# Patient Record
Sex: FEMALE | Race: WHITE | HISPANIC OR LATINO
[De-identification: names, ages, dates, MRNs, and addresses within clinical notes are randomized per-mention and may not be internally consistent; named-entity substitution may affect disease eponyms.]

---

## 2022-05-12 PROBLEM — Z00.00 ENCOUNTER FOR PREVENTIVE HEALTH EXAMINATION: Status: ACTIVE | Noted: 2022-05-12

## 2022-05-17 ENCOUNTER — APPOINTMENT (OUTPATIENT)
Dept: PAIN MANAGEMENT | Facility: CLINIC | Age: 62
End: 2022-05-17
Payer: MEDICAID

## 2022-05-17 ENCOUNTER — NON-APPOINTMENT (OUTPATIENT)
Age: 62
End: 2022-05-17

## 2022-05-17 VITALS
DIASTOLIC BLOOD PRESSURE: 68 MMHG | TEMPERATURE: 98 F | WEIGHT: 116 LBS | SYSTOLIC BLOOD PRESSURE: 132 MMHG | HEIGHT: 64 IN | BODY MASS INDEX: 19.81 KG/M2

## 2022-05-17 DIAGNOSIS — Z86.79 PERSONAL HISTORY OF OTHER DISEASES OF THE CIRCULATORY SYSTEM: ICD-10-CM

## 2022-05-17 DIAGNOSIS — Z78.9 OTHER SPECIFIED HEALTH STATUS: ICD-10-CM

## 2022-05-17 PROCEDURE — 99204 OFFICE O/P NEW MOD 45 MIN: CPT

## 2022-05-17 RX ORDER — NEBIVOLOL HYDROCHLORIDE 2.5 MG/1
2.5 TABLET ORAL
Refills: 0 | Status: ACTIVE | COMMUNITY

## 2022-05-17 NOTE — ASSESSMENT
[FreeTextEntry1] : 62 yof w/ severe low back and bilateral leg pain associated with numbness and weakness.\par \par I have personally reviewed the patient's xray in detail and discussed it with them which is significant for spondylosis at L5-S1.\par \par The patient has failed to have relief with over six weeks of physical therapy within the last three months and all medications. GIven their failure to improve with all other conservative measures recommend MRI lumbar spine. Patient will return to review imaging and plan for potential intervention.\par \par Physical therapy prescribed - goal will be to increase ROM, strengthening, postural training, other modalities ad nikole which may include massage and stim. Goals of therapy discussed with the patient in detail and will be discussed with physical therapist. Patient will follow-up following course of physical therapy to monitor progress and adjust therapy as needed.\par \par Acetaminophen 1,000 mg q8h prn for moderate pain. Risks, benefits, and alternatives of acetaminophen discussed with patient.\par \par \par Diet and nutritional strategies discussed which may improve patients pain and will improve overall health.\par \par

## 2022-05-17 NOTE — PHYSICAL EXAM

## 2022-05-17 NOTE — HISTORY OF PRESENT ILLNESS
[8] : 3. What number best describes how, during the past week, pain has interfered with your general activity? 8/10 pain [___ yrs] : [unfilled] year(s) ago [Paroxysmal] : paroxysmal [7] : a minimum pain level of 7/10 [9] : a maximum pain level of 9/10 [Dull] : dull [Aching] : aching [Throbbing] : throbbing [Shooting] : shooting [Bending] : bending [Walking] : walking [FreeTextEntry1] : 62 yof presents w/ severe low back pain. The pain radiates down her lower extremities bilaterally. Her legs feel heavy. Her legs are numb. Quality of life is impaired. There has been a severe exacerbation of the patient's chronic pain. [FreeTextEntry2] : 24 [FreeTextEntry7] : Lower back pain

## 2022-06-09 ENCOUNTER — APPOINTMENT (OUTPATIENT)
Dept: PAIN MANAGEMENT | Facility: CLINIC | Age: 62
End: 2022-06-09
Payer: MEDICAID

## 2022-06-09 PROCEDURE — 99213 OFFICE O/P EST LOW 20 MIN: CPT | Mod: 95

## 2022-06-09 NOTE — HISTORY OF PRESENT ILLNESS
[___ yrs] : [unfilled] year(s) ago [Paroxysmal] : paroxysmal [7] : a minimum pain level of 7/10 [9] : a maximum pain level of 9/10 [Dull] : dull [Aching] : aching [Throbbing] : throbbing [Shooting] : shooting [Bending] : bending [Walking] : walking [8] : 3. What number best describes how, during the past week, pain has interfered with your general activity? 8/10 pain [FreeTextEntry1] : Verbal consent was given by/on: Edwin Wilcox (06/09/22):\par \par Patient location: Home\par \par Physician location: Office\par \par Reason for Telehealth visit: Back pain\par \par She wishes to review recent MRI. She still has pain down both of her legs. Quality of life is impaired. There has been a severe exacerbation of the patient's chronic pain. \par \par This service took place using a two way audio and visual platform. The patient and Dr. Basilio were both able to see each other and communicate through video. There were no barriers to communication. Greater then 50% of the time spent in the encounter involved counseling and coordination of care. \par \par Time spent on visit: 30 minutes\par \par \par HPI: 62 yof presents w/ severe low back pain. The pain radiates down her lower extremities bilaterally. Her legs feel heavy. Her legs are numb. Quality of life is impaired. There has been a severe exacerbation of the patient's chronic pain. [FreeTextEntry7] : Lower back pain  [FreeTextEntry2] : 24

## 2022-06-09 NOTE — ASSESSMENT
[FreeTextEntry1] : 62 yof w/ severe low back and bilateral leg pain associated with numbness and weakness.\par \par I have personally reviewed the patient's xray in detail and discussed it with them which is significant for spondylosis at L5-S1.\par \par I have personally reviewed the patient's MRI in detail and discussed it with them which is significant for moderate bilateral foraminal stenosis at L5-S1.\par \par Physical therapy prescribed - goal will be to increase ROM, strengthening, postural training, other modalities ad nikole which may include massage and stim. Goals of therapy discussed with the patient in detail and will be discussed with physical therapist. Patient will follow-up following course of physical therapy to monitor progress and adjust therapy as needed.\par \par Acetaminophen 1,000 mg q8h prn for moderate pain. Risks, benefits, and alternatives of acetaminophen discussed with patient.\par \par If no relief plan for L5-S1 interlaminar CHETAN.\par \par Diet and nutritional strategies discussed which may improve patients pain and will improve overall health.\par \par I explained to patient benefits and limitation of TeleMedicine visits. Patient understands that limitations include inability to perform comprehensive physical exam, which may lead to potential diagnostic inconsistencies.  Patient understands that diagnosis and treatment may be limited by these inconsistencies and patient agrees to proceed with care plan\par \par \par \par \par

## 2022-06-09 NOTE — PHYSICAL EXAM
[de-identified] : Constitutional: Well-developed, in no acute distress\par \par Psychiatric: Appropriate mood and affect, oriented to time, place, person, and situation\par \par \par

## 2022-08-30 ENCOUNTER — APPOINTMENT (OUTPATIENT)
Dept: PAIN MANAGEMENT | Facility: CLINIC | Age: 62
End: 2022-08-30

## 2022-08-30 VITALS
WEIGHT: 120 LBS | HEART RATE: 67 BPM | DIASTOLIC BLOOD PRESSURE: 70 MMHG | HEIGHT: 64 IN | BODY MASS INDEX: 20.49 KG/M2 | SYSTOLIC BLOOD PRESSURE: 117 MMHG

## 2022-08-30 PROCEDURE — 99214 OFFICE O/P EST MOD 30 MIN: CPT

## 2022-08-30 NOTE — HISTORY OF PRESENT ILLNESS
[___ yrs] : [unfilled] year(s) ago [Paroxysmal] : paroxysmal [7] : a minimum pain level of 7/10 [9] : a maximum pain level of 9/10 [Dull] : dull [Aching] : aching [Throbbing] : throbbing [Shooting] : shooting [Bending] : bending [Walking] : walking [8] : 3. What number best describes how, during the past week, pain has interfered with your general activity? 8/10 pain [FreeTextEntry1] : Interval History:\par Patient returns for follow-up today. Her legs remain numb. She continues to have pain. She is doing physical therapy and stretching. Quality of life is impaired. There has been a severe exacerbation of the patient's chronic pain. She does have new pain in her upper back. She is doing yoga.\par \par \par HPI: 62 yof presents w/ severe low back pain. The pain radiates down her lower extremities bilaterally. Her legs feel heavy. Her legs are numb. Quality of life is impaired. There has been a severe exacerbation of the patient's chronic pain. [FreeTextEntry7] : Lower back pain  [FreeTextEntry2] : 24

## 2022-08-30 NOTE — DATA REVIEWED
[FreeTextEntry1] : MRI Lumbar Spine (05/27/22):\par \par L4-L5: posterior disc bulge, central and neural foramina are patent\par L5-S1: 2 mm retrolisthesis, moderate bilateral foraminal stenosis, subligamentous disc herniation. No spinal stenosis.\par \par Xray Lumbar Spine:\par Multiple levels of facet arthropathy worst at L5-S1.

## 2022-08-30 NOTE — ASSESSMENT
[FreeTextEntry1] : 62 yof w/ severe low back and bilateral leg pain associated with numbness and weakness. New upper back pain is likely related to muscular spasm, cannot rule out disc related issue. \par \par I have personally reviewed the patient's xray in detail and discussed it with them which is significant for spondylosis at L5-S1.\par \par I have personally reviewed the patient's MRI in detail and discussed it with them which is significant for moderate bilateral foraminal stenosis at L5-S1 there is no central stenosis.\par \par Physical therapy prescribed - goal will be to increase ROM, strengthening, postural training, other modalities ad nikole which may include massage and stim. Goals of therapy discussed with the patient in detail and will be discussed with physical therapist. Patient will follow-up following course of physical therapy to monitor progress and adjust therapy as needed.\par \par Acetaminophen 1,000 mg q8h prn for moderate pain. Risks, benefits, and alternatives of acetaminophen discussed with patient.\par \par If no relief plan for L5-S1 interlaminar CHETAN.\par \par Diet and nutritional strategies discussed which may improve patients pain and will improve overall health.\par \par \par \par

## 2022-08-30 NOTE — PHYSICAL EXAM

## 2022-09-08 ENCOUNTER — APPOINTMENT (OUTPATIENT)
Dept: PAIN MANAGEMENT | Facility: CLINIC | Age: 62
End: 2022-09-08

## 2022-09-08 PROCEDURE — 99443: CPT

## 2022-09-08 RX ORDER — DICLOFENAC SODIUM 75 MG/1
75 TABLET, DELAYED RELEASE ORAL
Qty: 60 | Refills: 0 | Status: ACTIVE | COMMUNITY
Start: 2022-09-08 | End: 1900-01-01

## 2022-09-14 RX ORDER — DICLOFENAC SODIUM 1% 10 MG/G
1 GEL TOPICAL
Qty: 100 | Refills: 0 | Status: ACTIVE | COMMUNITY
Start: 2022-09-14 | End: 1900-01-01

## 2023-01-09 ENCOUNTER — APPOINTMENT (OUTPATIENT)
Dept: PAIN MANAGEMENT | Facility: CLINIC | Age: 63
End: 2023-01-09
Payer: MEDICAID

## 2023-01-09 VITALS
BODY MASS INDEX: 20.49 KG/M2 | WEIGHT: 120 LBS | SYSTOLIC BLOOD PRESSURE: 117 MMHG | HEIGHT: 64 IN | TEMPERATURE: 98 F | DIASTOLIC BLOOD PRESSURE: 74 MMHG

## 2023-01-09 PROCEDURE — 99214 OFFICE O/P EST MOD 30 MIN: CPT

## 2023-01-09 RX ORDER — FLUTICASONE PROPIONATE 50 UG/1
50 SPRAY, METERED NASAL
Qty: 16 | Refills: 0 | Status: ACTIVE | COMMUNITY
Start: 2022-08-13

## 2023-01-09 RX ORDER — GABAPENTIN 100 MG/1
100 CAPSULE ORAL
Qty: 180 | Refills: 0 | Status: ACTIVE | COMMUNITY
Start: 2022-09-15

## 2023-01-09 RX ORDER — AZELASTINE HYDROCHLORIDE 137 UG/1
137 SPRAY, METERED NASAL
Qty: 30 | Refills: 0 | Status: ACTIVE | COMMUNITY
Start: 2023-01-03

## 2023-01-09 NOTE — ASSESSMENT
[FreeTextEntry1] : 62 yof w/ severe low back and bilateral leg pain associated with numbness and weakness now with new xrays for review. Xrays reveal no acute issue with the left knee and hip. \par \par I have personally reviewed the patient's xray in detail and discussed it with them which is significant for spondylosis at L5-S1.\par \par I have personally reviewed the patient's MRI in detail and discussed it with them which is significant for moderate bilateral foraminal stenosis at L5-S1 there is no central stenosis.\par \par Physical therapy prescribed - goal will be to increase ROM, strengthening, postural training, other modalities ad nikole which may include massage and stim. Goals of therapy discussed with the patient in detail and will be discussed with physical therapist. Patient will follow-up following course of physical therapy to monitor progress and adjust therapy as needed.\par \par Acetaminophen 1,000 mg q8h prn for moderate pain. Risks, benefits, and alternatives of acetaminophen discussed with patient.\par \par If no relief plan for L5-S1 interlaminar CHETAN.\par \par Diet and nutritional strategies discussed which may improve patients pain and will improve overall health.\par \par \par \par

## 2023-01-09 NOTE — PHYSICAL EXAM

## 2023-01-09 NOTE — HISTORY OF PRESENT ILLNESS
[8] : 3. What number best describes how, during the past week, pain has interfered with your general activity? 8/10 pain [___ yrs] : [unfilled] year(s) ago [Paroxysmal] : paroxysmal [7] : a minimum pain level of 7/10 [9] : a maximum pain level of 9/10 [Dull] : dull [Aching] : aching [Throbbing] : throbbing [Shooting] : shooting [Bending] : bending [Walking] : walking [FreeTextEntry1] : Interval History:\par Patient returns for follow-up today. Her legs remain numb. She continues to have pain. She is doing physical therapy and stretching. Quality of life is impaired. There has been a severe exacerbation of the patient's chronic pain. She had new xrays for her left hip and femur. No fx. She is still doing yoga. Physical therapy has been helpful.\par \par \par HPI: 62 yof presents w/ severe low back pain. The pain radiates down her lower extremities bilaterally. Her legs feel heavy. Her legs are numb. Quality of life is impaired. There has been a severe exacerbation of the patient's chronic pain. [FreeTextEntry2] : 24 [FreeTextEntry7] : Lower back pain

## 2023-06-19 ENCOUNTER — APPOINTMENT (OUTPATIENT)
Dept: PAIN MANAGEMENT | Facility: CLINIC | Age: 63
End: 2023-06-19
Payer: MEDICAID

## 2023-06-19 VITALS
SYSTOLIC BLOOD PRESSURE: 99 MMHG | WEIGHT: 120 LBS | HEIGHT: 64 IN | BODY MASS INDEX: 20.49 KG/M2 | DIASTOLIC BLOOD PRESSURE: 62 MMHG

## 2023-06-19 DIAGNOSIS — M47.817 SPONDYLOSIS W/OUT MYELOPATHY OR RADICULOPATHY, LUMBOSACRAL REGION: ICD-10-CM

## 2023-06-19 DIAGNOSIS — M54.16 RADICULOPATHY, LUMBAR REGION: ICD-10-CM

## 2023-06-19 DIAGNOSIS — M79.10 MYALGIA, UNSPECIFIED SITE: ICD-10-CM

## 2023-06-19 PROCEDURE — 99214 OFFICE O/P EST MOD 30 MIN: CPT

## 2023-06-19 RX ORDER — AZITHROMYCIN 250 MG/1
250 TABLET, FILM COATED ORAL
Qty: 6 | Refills: 0 | Status: ACTIVE | COMMUNITY
Start: 2023-03-06

## 2023-06-19 NOTE — HISTORY OF PRESENT ILLNESS
[8] : 3. What number best describes how, during the past week, pain has interfered with your general activity? 8/10 pain [___ yrs] : [unfilled] year(s) ago [Paroxysmal] : paroxysmal [7] : a minimum pain level of 7/10 [9] : a maximum pain level of 9/10 [Dull] : dull [Aching] : aching [Throbbing] : throbbing [Shooting] : shooting [Bending] : bending [Walking] : walking [FreeTextEntry1] : Interval History:\par Patient returns for follow-up today. Her legs remain numb. She continues to have pain. She is doing physical therapy and stretching but recently had severe exacerbations of her pain in PT. Quality of life is impaired. There has been a severe exacerbation of the patient's chronic pain. She does have osteoporosis confirmed recently. \par \par HPI: 62 yof presents w/ severe low back pain. The pain radiates down her lower extremities bilaterally. Her legs feel heavy. Her legs are numb. Quality of life is impaired. There has been a severe exacerbation of the patient's chronic pain. [FreeTextEntry2] : 24 [FreeTextEntry7] : Lower back pain

## 2023-06-19 NOTE — ASSESSMENT
[FreeTextEntry1] : 63 yof w/ severe low back and bilateral leg pain associated with numbness and weakness for follow-up\par \par I have personally reviewed the patient's xray in detail and discussed it with them which is significant for spondylosis at L5-S1.\par \par I have personally reviewed the patient's MRI in detail and discussed it with them which is significant for moderate bilateral foraminal stenosis at L5-S1 there is no central stenosis.\par \par She does not wish to have CHETAN.\par \par She does have osteoporosis.\par \par Physical therapy prescribed - goal will be to increase ROM, strengthening, postural training, other modalities ad nikole which may include massage and stim. Goals of therapy discussed with the patient in detail and will be discussed with physical therapist. Patient will follow-up following course of physical therapy to monitor progress and adjust therapy as needed.\par \par Acetaminophen 1,000 mg q8h prn for moderate pain. Risks, benefits, and alternatives of acetaminophen discussed with patient.\par \par If no relief plan for L5-S1 interlaminar CHETAN.\par \par Diet and nutritional strategies discussed which may improve patients pain and will improve overall health.\par \par \par \par

## 2023-06-19 NOTE — PHYSICAL EXAM

## 2023-12-18 ENCOUNTER — APPOINTMENT (OUTPATIENT)
Dept: PAIN MANAGEMENT | Facility: CLINIC | Age: 63
End: 2023-12-18